# Patient Record
Sex: MALE | Race: WHITE | URBAN - METROPOLITAN AREA
[De-identification: names, ages, dates, MRNs, and addresses within clinical notes are randomized per-mention and may not be internally consistent; named-entity substitution may affect disease eponyms.]

---

## 2022-01-06 ENCOUNTER — OFFICE VISIT (OUTPATIENT)
Dept: URBAN - METROPOLITAN AREA CLINIC 50 | Facility: CLINIC | Age: 67
End: 2022-01-06
Payer: MEDICARE

## 2022-01-06 DIAGNOSIS — H18.512 ENDOTHELIAL CORNEAL DYSTROPHY, LEFT EYE: ICD-10-CM

## 2022-01-06 DIAGNOSIS — H18.511 ENDOTHELIAL CORNEAL DYSTROPHY, RIGHT EYE: ICD-10-CM

## 2022-01-06 DIAGNOSIS — H52.03 HYPERMETROPIA, BILATERAL: Primary | ICD-10-CM

## 2022-01-06 DIAGNOSIS — H26.492 OTHER SECONDARY CATARACT, LEFT EYE: ICD-10-CM

## 2022-01-06 DIAGNOSIS — H25.11 AGE-RELATED NUCLEAR CATARACT, RIGHT EYE: ICD-10-CM

## 2022-01-06 PROCEDURE — 99214 OFFICE O/P EST MOD 30 MIN: CPT | Performed by: OPTOMETRIST

## 2022-01-06 RX ORDER — METOPROLOL 50 MG/1
50 MG TABLET ORAL
Refills: 0 | Status: ACTIVE
Start: 2022-01-06

## 2022-01-06 RX ORDER — ROSUVASTATIN CALCIUM 20 MG/1
20 MG TABLET, FILM COATED ORAL
Refills: 0 | Status: ACTIVE
Start: 2022-01-06

## 2022-01-06 RX ORDER — LISINOPRIL 10 MG/1
10 MG TABLET ORAL
Refills: 0 | Status: ACTIVE
Start: 2022-01-06

## 2022-01-06 RX ORDER — SODIUM CHLORIDE 50 MG/ML
5 % SOLUTION OPHTHALMIC
Refills: 0 | Status: ACTIVE
Start: 2022-01-06

## 2022-01-06 RX ORDER — ASPIRIN 325 MG
81 MG TABLET ORAL
Refills: 0 | Status: ACTIVE
Start: 2022-01-06

## 2022-01-06 ASSESSMENT — INTRAOCULAR PRESSURE
OD: 18
OS: 15

## 2022-01-06 ASSESSMENT — VISUAL ACUITY
OD: 20/40
OS: 20/40

## 2022-01-06 NOTE — IMPRESSION/PLAN
Impression: Age-related nuclear cataract, right eye: H25.11. Plan: Cataracts account for the patient's complaints. Discussed options, surgery or spectacle change. Explained surgery risks, benefits, procedures and recovery. Patient defers surgery and elects to change glasses first.  If there is no improvement, ok to schedule surgery.

## 2022-01-06 NOTE — IMPRESSION/PLAN
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 Plan: Currently in the care of Dr. Zak Love. Patient is instructed to Continue care with Dr. Alberto Mtz for endothelial dystrophy.